# Patient Record
Sex: MALE | Race: WHITE | NOT HISPANIC OR LATINO | Employment: FULL TIME | URBAN - METROPOLITAN AREA
[De-identification: names, ages, dates, MRNs, and addresses within clinical notes are randomized per-mention and may not be internally consistent; named-entity substitution may affect disease eponyms.]

---

## 2018-03-07 ENCOUNTER — LAB REQUISITION (OUTPATIENT)
Dept: LAB | Facility: OTHER | Age: 33
End: 2018-03-07

## 2018-03-07 DIAGNOSIS — Z00.00 ROUTINE GENERAL MEDICAL EXAMINATION AT A HEALTH CARE FACILITY: ICD-10-CM

## 2018-03-07 LAB — HBV SURFACE AB SER RIA-ACNC: NORMAL

## 2018-03-07 PROCEDURE — 86706 HEP B SURFACE ANTIBODY: CPT | Performed by: PHYSICIAN ASSISTANT

## 2018-10-08 ENCOUNTER — OFFICE VISIT (OUTPATIENT)
Dept: FAMILY MEDICINE CLINIC | Facility: CLINIC | Age: 33
End: 2018-10-08

## 2018-10-08 VITALS
HEART RATE: 85 BPM | SYSTOLIC BLOOD PRESSURE: 150 MMHG | OXYGEN SATURATION: 98 % | DIASTOLIC BLOOD PRESSURE: 100 MMHG | HEIGHT: 70 IN | WEIGHT: 222.7 LBS | BODY MASS INDEX: 31.88 KG/M2

## 2018-10-08 DIAGNOSIS — M25.561 CHRONIC PAIN OF BOTH KNEES: ICD-10-CM

## 2018-10-08 DIAGNOSIS — G89.29 CHRONIC PAIN OF BOTH KNEES: ICD-10-CM

## 2018-10-08 DIAGNOSIS — I10 ESSENTIAL HYPERTENSION: Primary | ICD-10-CM

## 2018-10-08 DIAGNOSIS — M25.562 CHRONIC PAIN OF BOTH KNEES: ICD-10-CM

## 2018-10-08 DIAGNOSIS — K51.00 ULCERATIVE PANCOLITIS WITHOUT COMPLICATION (HCC): ICD-10-CM

## 2018-10-08 PROBLEM — K20.90 ESOPHAGITIS: Status: ACTIVE | Noted: 2018-10-08

## 2018-10-08 LAB
ALBUMIN SERPL-MCNC: 5.2 G/DL (ref 3.5–5.2)
ALBUMIN/GLOB SERPL: 1.9 G/DL
ALP SERPL-CCNC: 97 U/L (ref 39–117)
ALT SERPL-CCNC: 71 U/L (ref 1–41)
AST SERPL-CCNC: 47 U/L (ref 1–40)
BILIRUB SERPL-MCNC: 0.4 MG/DL (ref 0.1–1.2)
BUN SERPL-MCNC: 7 MG/DL (ref 6–20)
BUN/CREAT SERPL: 6.7 (ref 7–25)
CALCIUM SERPL-MCNC: 9.8 MG/DL (ref 8.6–10.5)
CHLORIDE SERPL-SCNC: 102 MMOL/L (ref 98–107)
CO2 SERPL-SCNC: 30.7 MMOL/L (ref 22–29)
CREAT SERPL-MCNC: 1.05 MG/DL (ref 0.76–1.27)
CRP SERPL-MCNC: 0.41 MG/DL (ref 0–0.5)
ERYTHROCYTE [DISTWIDTH] IN BLOOD BY AUTOMATED COUNT: 13.4 % (ref 11.5–14.5)
ERYTHROCYTE [SEDIMENTATION RATE] IN BLOOD BY WESTERGREN METHOD: 2 MM/HR (ref 0–15)
GLOBULIN SER CALC-MCNC: 2.8 GM/DL
GLUCOSE SERPL-MCNC: 102 MG/DL (ref 65–99)
HCT VFR BLD AUTO: 48.2 % (ref 40.4–52.2)
HGB BLD-MCNC: 16.4 G/DL (ref 13.7–17.6)
MCH RBC QN AUTO: 31.7 PG (ref 27–32.7)
MCHC RBC AUTO-ENTMCNC: 34 G/DL (ref 32.6–36.4)
MCV RBC AUTO: 93.1 FL (ref 79.8–96.2)
PLATELET # BLD AUTO: 242 10*3/MM3 (ref 140–500)
POTASSIUM SERPL-SCNC: 4.1 MMOL/L (ref 3.5–5.2)
PROT SERPL-MCNC: 8 G/DL (ref 6–8.5)
RBC # BLD AUTO: 5.18 10*6/MM3 (ref 4.6–6)
SODIUM SERPL-SCNC: 144 MMOL/L (ref 136–145)
WBC # BLD AUTO: 5.7 10*3/MM3 (ref 4.5–10.7)

## 2018-10-08 PROCEDURE — 99204 OFFICE O/P NEW MOD 45 MIN: CPT | Performed by: INTERNAL MEDICINE

## 2018-10-08 RX ORDER — LISINOPRIL 20 MG/1
20 TABLET ORAL DAILY
Qty: 30 TABLET | Refills: 2 | Status: SHIPPED | OUTPATIENT
Start: 2018-10-08 | End: 2019-01-09 | Stop reason: SDUPTHER

## 2018-10-08 NOTE — PROGRESS NOTES
Subjective   Adonis Grover is a 33 y.o. male who presents today for:    Hypertension (New patient)    History of Present Illness     This new patient has not had a family doctor in many years.  He does have a history of elevated blood pressures in the past but has not been on any medication for years.  He denies cardiovascular symptoms.  He does not monitor his blood pressure at home.    He was diagnosed with ulcerative colitis several years ago, treated until his symptoms resolved, but then stopped taking the medication.  He was having no problems with diarrhea until earlier this year.  Now has intermittent episodes of diarrhea without blood.  He denies weight loss.    He also has mild, chronic, recurrent bilateral knee pain.  He describes it as achy.  He feels like his knees are stiff/swollen, without significant effusion or redness.    There is a family history of hypertension in his mother.    Patient Health History Form was reviewed during the ov.      Mr. Grover  reports that he has never smoked. His smokeless tobacco use includes Chew. He reports that he drinks about 3.0 oz of alcohol per week . He reports that he does not use drugs.     No Known Allergies    Current Outpatient Prescriptions:   •  Multiple Vitamin (MULTI VITAMIN PO), Take 1 tablet by mouth Daily., Disp: , Rfl:       Review of Systems   Constitutional: Negative for chills, diaphoresis, fatigue, fever and unexpected weight change.   HENT: Positive for congestion, postnasal drip and rhinorrhea.    Eyes: Negative for visual disturbance.   Respiratory: Positive for cough. Negative for shortness of breath.    Cardiovascular: Negative for chest pain and palpitations.   Gastrointestinal: Positive for diarrhea (intermittent, episodic). Negative for abdominal pain, anal bleeding, blood in stool and rectal pain.   Endocrine: Negative for cold intolerance and heat intolerance.   Genitourinary: Negative for difficulty urinating.   Musculoskeletal:  "Positive for arthralgias and joint swelling. Negative for back pain and myalgias.   Skin: Negative for rash.   Allergic/Immunologic: Positive for environmental allergies.   Neurological: Negative for dizziness, syncope and numbness.   Hematological: Does not bruise/bleed easily.   Psychiatric/Behavioral: Positive for dysphoric mood. The patient is nervous/anxious.          Objective   Vitals:    10/08/18 1008   BP: 150/100   BP Location: Left arm   Patient Position: Sitting   Cuff Size: Large Adult   Pulse: 85   SpO2: 98%   Weight: 101 kg (222 lb 11.2 oz)   Height: 177.8 cm (70\")     Physical Exam   Constitutional: He is oriented to person, place, and time. He appears well-developed.   Overweight   Eyes: Conjunctivae are normal. No scleral icterus.   Neck: Carotid bruit is not present. No thyroid mass and no thyromegaly present.   Cardiovascular: Normal rate, regular rhythm, normal heart sounds and intact distal pulses.    No pedal edema.   Pulmonary/Chest: Effort normal and breath sounds normal.   Abdominal: Soft. Bowel sounds are normal. He exhibits no abdominal bruit.   Neurological: He is alert and oriented to person, place, and time. He has normal strength.   Psychiatric: He has a normal mood and affect.   Bilateral knees: No erythema, warmth, or effusion.  No significant crepitus with flexion and extension.  No ligamentous instability.  No significant joint line tenderness.            Adonis was seen today for hypertension.    Diagnoses and all orders for this visit:    Essential hypertension  Start lisinopril as ordered below.  He was counseled regarding the potential side effects.  He should contact us if those develop, or if any other problems develop after starting the medication.  Return in approximate 6 weeks for blood pressure recheck and labs.  -     Comprehensive Metabolic Panel  -     lisinopril (PRINIVIL,ZESTRIL) 20 MG tablet; Take 1 tablet by mouth Daily.    Ulcerative pancolitis without " complication (CMS/HCC)  -     Comprehensive Metabolic Panel  -     CBC (No Diff)  -     C-reactive Protein  -     Sedimentation Rate  -     Ambulatory Referral to Gastroenterology  The labs as ordered above for possibility of active disease.  Refer back to the gastroenterologist who was taking care of him previously.     Chronic pain of both knees  Check the labs as ordered above to ensure there is no element of inflammatory arthritis (IBD-related arthritis).  Avoid NSAIDs and use Tylenol only for now, especially since we do not know the status of his inflammatory bowel disease.

## 2018-10-09 NOTE — PROGRESS NOTES
Liver enzymes are elevated.  Recommend these be rechecked when he sees Dr. Ochoa later this year for UC follow-up; or prior to his follow-up visit with me in November, whichever is first.  Otherwise, rest of the labs look good.  TAS

## 2019-01-09 RX ORDER — LISINOPRIL 20 MG/1
20 TABLET ORAL DAILY
Qty: 30 TABLET | Refills: 0 | Status: SHIPPED | OUTPATIENT
Start: 2019-01-09 | End: 2019-02-27 | Stop reason: SDUPTHER

## 2019-01-21 ENCOUNTER — OFFICE VISIT (OUTPATIENT)
Dept: FAMILY MEDICINE CLINIC | Facility: CLINIC | Age: 34
End: 2019-01-21

## 2019-01-21 VITALS
DIASTOLIC BLOOD PRESSURE: 84 MMHG | BODY MASS INDEX: 31.85 KG/M2 | HEART RATE: 87 BPM | HEIGHT: 70 IN | SYSTOLIC BLOOD PRESSURE: 130 MMHG | WEIGHT: 222.5 LBS | OXYGEN SATURATION: 98 %

## 2019-01-21 DIAGNOSIS — I10 ESSENTIAL HYPERTENSION: Primary | ICD-10-CM

## 2019-01-21 DIAGNOSIS — R07.9 CHEST PAIN, UNSPECIFIED TYPE: ICD-10-CM

## 2019-01-21 PROCEDURE — 99214 OFFICE O/P EST MOD 30 MIN: CPT | Performed by: INTERNAL MEDICINE

## 2019-01-21 PROCEDURE — 93000 ELECTROCARDIOGRAM COMPLETE: CPT | Performed by: INTERNAL MEDICINE

## 2019-01-21 RX ORDER — PANTOPRAZOLE SODIUM 40 MG/1
40 TABLET, DELAYED RELEASE ORAL DAILY
Qty: 30 TABLET | Refills: 1 | Status: SHIPPED | OUTPATIENT
Start: 2019-01-21 | End: 2020-11-13

## 2019-02-27 RX ORDER — LISINOPRIL 20 MG/1
TABLET ORAL
Qty: 30 TABLET | Refills: 6 | Status: SHIPPED | OUTPATIENT
Start: 2019-02-27 | End: 2019-11-20 | Stop reason: SDUPTHER

## 2019-11-20 RX ORDER — LISINOPRIL 20 MG/1
20 TABLET ORAL DAILY
Qty: 30 TABLET | Refills: 0 | Status: SHIPPED | OUTPATIENT
Start: 2019-11-20 | End: 2019-12-18 | Stop reason: SDUPTHER

## 2019-11-21 ENCOUNTER — OFFICE VISIT (OUTPATIENT)
Dept: FAMILY MEDICINE CLINIC | Facility: CLINIC | Age: 34
End: 2019-11-21

## 2019-11-21 VITALS
TEMPERATURE: 98.4 F | RESPIRATION RATE: 18 BRPM | HEART RATE: 84 BPM | SYSTOLIC BLOOD PRESSURE: 122 MMHG | OXYGEN SATURATION: 98 % | DIASTOLIC BLOOD PRESSURE: 80 MMHG | WEIGHT: 214 LBS | BODY MASS INDEX: 30.64 KG/M2 | HEIGHT: 70 IN

## 2019-11-21 DIAGNOSIS — I10 ESSENTIAL HYPERTENSION: Primary | ICD-10-CM

## 2019-11-21 PROCEDURE — 99213 OFFICE O/P EST LOW 20 MIN: CPT | Performed by: NURSE PRACTITIONER

## 2019-11-21 RX ORDER — SULFAMETHOXAZOLE AND TRIMETHOPRIM 800; 160 MG/1; MG/1
2 TABLET ORAL
COMMUNITY
Start: 2019-11-14 | End: 2019-12-14

## 2019-11-21 NOTE — PROGRESS NOTES
"Subjective   Adonis Grover is a 34 y.o. male.     Chief Complaint   Patient presents with   • Hypertension     F/u      Hypertension   This is a chronic problem. The current episode started more than 1 month ago. The problem has been waxing and waning since onset. The problem is uncontrolled. Pertinent negatives include no anxiety, blurred vision, chest pain, headaches, malaise/fatigue, peripheral edema or shortness of breath. Risk factors for coronary artery disease include male gender and family history. Past treatments include ACE inhibitors. The current treatment provides moderate improvement. There are no compliance problems.       I have reviewed the patient's medical history in detail and updated the computerized patient record.    The following portions of the patient's history were reviewed and updated as appropriate: allergies, current medications, past family history, past medical history, past social history, past surgical history and problem list.       Current Outpatient Medications:   •  lisinopril (PRINIVIL,ZESTRIL) 20 MG tablet, Take 1 tablet by mouth Daily., Disp: 30 tablet, Rfl: 0  •  Multiple Vitamin (MULTI VITAMIN PO), Take 1 tablet by mouth Daily., Disp: , Rfl:   •  pantoprazole (PROTONIX) 40 MG EC tablet, Take 1 tablet by mouth Daily., Disp: 30 tablet, Rfl: 1  •  sulfamethoxazole-trimethoprim (BACTRIM DS,SEPTRA DS) 800-160 MG per tablet, Take 2 tablets by mouth., Disp: , Rfl:     Review of Systems   Constitutional: Negative for malaise/fatigue.   Eyes: Negative for blurred vision.   Respiratory: Negative for shortness of breath.    Cardiovascular: Negative for chest pain.      Vitals:    11/21/19 0840 11/21/19 0912   BP: 140/84 122/80   BP Location: Left arm    Patient Position: Sitting    Cuff Size: Adult    Pulse: 84    Resp: 18    Temp: 98.4 °F (36.9 °C)    TempSrc: Oral    SpO2: 98%    Weight: 97.1 kg (214 lb)    Height: 177.8 cm (70\")        Objective   Physical Exam   Constitutional: He " is oriented to person, place, and time. He appears well-developed and well-nourished.   Cardiovascular: Normal rate, regular rhythm, normal heart sounds and intact distal pulses.   Pulmonary/Chest: Effort normal and breath sounds normal.   Neurological: He is alert and oriented to person, place, and time.   Skin: Skin is warm and dry.   Psychiatric:   No acute distress   Vitals reviewed.        Assessment/Plan   Adonis was seen today for hypertension.    Diagnoses and all orders for this visit:    Essential hypertension      Mr. Grover presents today to follow up on his blood pressure control. His blood pressure today in the office is 140/84 and 122/80. He is to continue taking Lisinopril 20 mg daily.   We discussed that he is having nausea and vomiting after taking Bactrim DS. He reports that he immediately vomits after taking the medication and when he does he usually vomits up anything that is in his stomach including his medications. I have advised him to call his infectious disease physician to make her aware of his intolerance for the Bactrim.   He is to follow up with me in July for an annual preventive exam with fasting labs the week before.

## 2019-12-18 RX ORDER — LISINOPRIL 20 MG/1
TABLET ORAL
Qty: 90 TABLET | Refills: 1 | Status: SHIPPED | OUTPATIENT
Start: 2019-12-18 | End: 2020-06-16

## 2020-06-16 RX ORDER — LISINOPRIL 20 MG/1
TABLET ORAL
Qty: 90 TABLET | Refills: 0 | Status: SHIPPED | OUTPATIENT
Start: 2020-06-16 | End: 2020-09-14

## 2020-06-22 DIAGNOSIS — Z11.59 ENCOUNTER FOR HEPATITIS C SCREENING TEST FOR LOW RISK PATIENT: ICD-10-CM

## 2020-06-22 DIAGNOSIS — Z00.00 ROUTINE GENERAL MEDICAL EXAMINATION AT A HEALTH CARE FACILITY: Primary | ICD-10-CM

## 2020-06-28 ENCOUNTER — RESULTS ENCOUNTER (OUTPATIENT)
Dept: FAMILY MEDICINE CLINIC | Facility: CLINIC | Age: 35
End: 2020-06-28

## 2020-06-28 DIAGNOSIS — Z11.59 ENCOUNTER FOR HEPATITIS C SCREENING TEST FOR LOW RISK PATIENT: ICD-10-CM

## 2020-06-28 DIAGNOSIS — Z00.00 ROUTINE GENERAL MEDICAL EXAMINATION AT A HEALTH CARE FACILITY: ICD-10-CM

## 2020-09-14 RX ORDER — LISINOPRIL 20 MG/1
20 TABLET ORAL DAILY
Qty: 90 TABLET | Refills: 0 | Status: SHIPPED | OUTPATIENT
Start: 2020-09-14 | End: 2021-01-12 | Stop reason: SDUPTHER

## 2020-11-12 ENCOUNTER — TELEPHONE (OUTPATIENT)
Dept: FAMILY MEDICINE CLINIC | Facility: CLINIC | Age: 35
End: 2020-11-12

## 2020-11-12 NOTE — TELEPHONE ENCOUNTER
PATIENT CALLED AND STATES HIS BLOOD PRESSURE IS RUNNING 178/117 LAST NIGHT. HE IS CURRENTLY ON   lisinopril (PRINIVIL,ZESTRIL) 20 MG tablet    HE HAS MADE AN APPOINTMENT FOR 11/17/2020 VIA [employer] BUT PREFERS OTHER TELEHEALTH OPTIONS.  HE WANTS TO KNOW IF HE CAN INCREASE HIS MEDICATION.  PLEASE CALL AND ADVISE [phone]

## 2020-11-12 NOTE — TELEPHONE ENCOUNTER
I will need to see him in person to assess his blood pressure. Will not make changes until I can see him and distinguish if that was a one time event. Please call him and ask him to come in and to record his blood pressures over the next few days.

## 2020-11-13 ENCOUNTER — TELEMEDICINE (OUTPATIENT)
Dept: FAMILY MEDICINE CLINIC | Facility: CLINIC | Age: 35
End: 2020-11-13

## 2020-11-13 DIAGNOSIS — I10 ESSENTIAL HYPERTENSION: Primary | ICD-10-CM

## 2020-11-13 PROCEDURE — 99214 OFFICE O/P EST MOD 30 MIN: CPT | Performed by: NURSE PRACTITIONER

## 2020-11-13 RX ORDER — HYDROCHLOROTHIAZIDE 12.5 MG/1
12.5 TABLET ORAL DAILY
Qty: 30 TABLET | Refills: 5 | Status: SHIPPED | OUTPATIENT
Start: 2020-11-13 | End: 2021-01-12 | Stop reason: SDUPTHER

## 2020-11-13 NOTE — PROGRESS NOTES
Subjective   Adonis Grover is a 35 y.o. male.     Chief Complaint   Patient presents with   • Hypertension     This visit has been rescheduled as a telehealth visit to comply with patient safety concerns in accordance with CDC recommendations. Total time of discussion was 18  minutes.    You have chosen to receive care through a telehealth visit.  Do you consent to use a video/audio connection for your medical care today? Yes    Hypertension  This is a chronic problem. The current episode started more than 1 year ago. Progression since onset: starte elevating 2 days ago  The problem is uncontrolled. Associated symptoms include headaches and shortness of breath (if moving around a lot). Pertinent negatives include no blurred vision, chest pain, malaise/fatigue, palpitations or peripheral edema. Risk factors for coronary artery disease include male gender and obesity. Past treatments include ACE inhibitors. Current antihypertension treatment includes ACE inhibitors.        I have reviewed the patient's medical history in detail and updated the computerized patient record.    The following portions of the patient's history were reviewed and updated as appropriate: allergies, current medications, past family history, past medical history, past social history, past surgical history and problem list.      Current Outpatient Medications:   •  lisinopril (PRINIVIL,ZESTRIL) 20 MG tablet, Take 1 tablet by mouth Daily. PATIENT NEEDS APPOINTMENT FOR FURTHER REFILLS, Disp: 90 tablet, Rfl: 0  •  hydroCHLOROthiazide (HYDRODIURIL) 12.5 MG tablet, Take 1 tablet by mouth Daily., Disp: 30 tablet, Rfl: 5     Review of Systems   Constitutional: Negative for malaise/fatigue.   Eyes: Negative for blurred vision.   Respiratory: Positive for shortness of breath (if moving around a lot).    Cardiovascular: Negative for chest pain and palpitations.       Objective    There were no vitals filed for this visit.     Physical Exam  Vitals signs  reviewed.   Constitutional:       Appearance: Normal appearance.   Neurological:      Mental Status: He is alert and oriented to person, place, and time.   Psychiatric:      Comments: No acute distress           Assessment/Plan   Diagnoses and all orders for this visit:    1. Essential hypertension (Primary)    Other orders  -     hydroCHLOROthiazide (HYDRODIURIL) 12.5 MG tablet; Take 1 tablet by mouth Daily.  Dispense: 30 tablet; Refill: 5    Mr. Grover presents per video visit with elevated blood pressure x 2 days. He had tried increasing his Lisinopril form 20 mg daily to 40 mg. He did not see any changes in his blood pressure. His reported blood pressures last night after taking a second dose of Lisinopril was running 180/107 and 161/100. He reports his most recent blood pressure at 2 pm today was 152/96.  He is to continue the Lisinopril 20 mg daily and start HCTZ 12.5 mg daily.   I have asked him to follow up in 2 weeks on his blood pressure control.

## 2020-11-30 ENCOUNTER — OFFICE VISIT (OUTPATIENT)
Dept: FAMILY MEDICINE CLINIC | Facility: CLINIC | Age: 35
End: 2020-11-30

## 2020-11-30 VITALS
DIASTOLIC BLOOD PRESSURE: 78 MMHG | TEMPERATURE: 96.6 F | WEIGHT: 220 LBS | HEIGHT: 70 IN | HEART RATE: 88 BPM | SYSTOLIC BLOOD PRESSURE: 130 MMHG | BODY MASS INDEX: 31.5 KG/M2 | OXYGEN SATURATION: 98 %

## 2020-11-30 DIAGNOSIS — I10 ESSENTIAL HYPERTENSION: Primary | ICD-10-CM

## 2020-11-30 PROCEDURE — 99213 OFFICE O/P EST LOW 20 MIN: CPT | Performed by: NURSE PRACTITIONER

## 2020-11-30 NOTE — PROGRESS NOTES
Subjective   Adonis Grover is a 35 y.o. male.     Chief Complaint   Patient presents with   • Hypertension   • Dizziness     History of Present Illness     I have reviewed the patient's medical history in detail and updated the computerized patient record.    The following portions of the patient's history were reviewed and updated as appropriate: allergies, current medications, past family history, past medical history, past social history, past surgical history and problem list.      Current Outpatient Medications:   •  hydroCHLOROthiazide (HYDRODIURIL) 12.5 MG tablet, Take 1 tablet by mouth Daily., Disp: 30 tablet, Rfl: 5  •  lisinopril (PRINIVIL,ZESTRIL) 20 MG tablet, Take 1 tablet by mouth Daily. PATIENT NEEDS APPOINTMENT FOR FURTHER REFILLS (Patient taking differently: Take 20 mg by mouth Daily.), Disp: 90 tablet, Rfl: 0     Review of Systems   Constitutional: Negative.    Eyes: Positive for blurred vision (with episode of dizziness).   Respiratory: Positive for shortness of breath (with episode of dizziness).    Cardiovascular: Negative.    Neurological: Positive for dizziness.   Psychiatric/Behavioral: Negative.        Objective    Vitals:    11/30/20 1404   BP: 130/78   Pulse: 88   Temp: 96.6 °F (35.9 °C)   SpO2: 98%     Physical Exam  Constitutional:       Appearance: Normal appearance.   Cardiovascular:      Rate and Rhythm: Normal rate and regular rhythm.      Pulses: Normal pulses.      Heart sounds: Normal heart sounds.   Pulmonary:      Effort: Pulmonary effort is normal.      Breath sounds: Normal breath sounds.   Musculoskeletal:      Right lower leg: No edema.      Left lower leg: No edema.   Neurological:      Mental Status: He is alert and oriented to person, place, and time.   Psychiatric:      Comments: No acute distress           Assessment/Plan   Diagnoses and all orders for this visit:    1. Essential hypertension (Primary)       Mr. Grover's blood pressure today in the office is 130/78. He  "reports that his blood pressure at home since starting the HCTZ with the lisinopril has been running around 120/82 or lower. He has had some dizziness since starting the medication. We discussed that even though he is taking a \"water pill\" to lower his blood pressure it is important that he drinks plenty of water to stay well hydrated. He is to continue both the lisinopril 20 mg daily and the HCTZ 12.5 mg daily as prescribed.   Follow up in 6 months for an annual physical exam with labs the week before.        "

## 2021-01-12 RX ORDER — LISINOPRIL 20 MG/1
20 TABLET ORAL DAILY
Qty: 30 TABLET | Refills: 4 | Status: SHIPPED | OUTPATIENT
Start: 2021-01-12 | End: 2021-04-05 | Stop reason: SDUPTHER

## 2021-01-12 RX ORDER — HYDROCHLOROTHIAZIDE 12.5 MG/1
12.5 TABLET ORAL DAILY
Qty: 30 TABLET | Refills: 4 | Status: SHIPPED | OUTPATIENT
Start: 2021-01-12 | End: 2021-04-05 | Stop reason: SDUPTHER

## 2021-01-12 NOTE — TELEPHONE ENCOUNTER
Caller: Adonis Grover    Relationship: Self    Best call back number: [phone]    Medication needed:   Requested Prescriptions     Pending Prescriptions Disp Refills   • hydroCHLOROthiazide (HYDRODIURIL) 12.5 MG tablet 30 tablet 5     Sig: Take 1 tablet by mouth Daily.   • lisinopril (PRINIVIL,ZESTRIL) 20 MG tablet 90 tablet 0     Sig: Take 1 tablet by mouth Daily. PATIENT NEEDS APPOINTMENT FOR FURTHER REFILLS       When do you need the refill by: tomorrow      Does the patient have less than a 3 day supply:  [x] Yes  [] No    What is the patient's preferred pharmacy: [facility] [address] - [phone]  - [phone] FX

## 2021-04-05 ENCOUNTER — TELEPHONE (OUTPATIENT)
Dept: FAMILY MEDICINE CLINIC | Facility: CLINIC | Age: 36
End: 2021-04-05

## 2021-04-05 RX ORDER — HYDROCHLOROTHIAZIDE 12.5 MG/1
12.5 TABLET ORAL DAILY
Qty: 30 TABLET | Refills: 4 | Status: SHIPPED | OUTPATIENT
Start: 2021-04-05 | End: 2021-08-23 | Stop reason: ALTCHOICE

## 2021-04-05 RX ORDER — LISINOPRIL 20 MG/1
20 TABLET ORAL DAILY
Qty: 30 TABLET | Refills: 4 | Status: SHIPPED | OUTPATIENT
Start: 2021-04-05 | End: 2021-08-23 | Stop reason: ALTCHOICE

## 2021-04-05 NOTE — TELEPHONE ENCOUNTER
Caller: Adonis Grover    Relationship: Self    Best call back number: [phone] (H)    Medication needed:   Requested Prescriptions     Pending Prescriptions Disp Refills   • lisinopril (PRINIVIL,ZESTRIL) 20 MG tablet 30 tablet 4     Sig: Take 1 tablet by mouth Daily.   • hydroCHLOROthiazide (HYDRODIURIL) 12.5 MG tablet 30 tablet 4     Sig: Take 1 tablet by mouth Daily.       When do you need the refill by: AS SOON AS POSSIBLE     What additional details did the patient provide when requesting the medication: PATIENT HAS  5 lisinopril (PRINIVIL,ZESTRIL) 20 MG tablet LEFT AND 13 hydroCHLOROthiazide (HYDRODIURIL) 12.5 MG tablet BUT STATED HE IS GOING OUT OF TOWN ON Saturday FOR 1 WEEK AND WILL NEED TO TAKE REFILL WITH HIM.   Does the patient have less than a 3 day supply:  [] Yes  [x] No    What is the patient's preferred pharmacy:      [facility] [address] - [phone] [facility] [phone]   [phone]    PATIENTS LAST OV: 11/30/20  PATIENTS NEXT OV: 6/2/21

## 2021-05-14 DIAGNOSIS — Z00.00 ROUTINE GENERAL MEDICAL EXAMINATION AT A HEALTH CARE FACILITY: Primary | ICD-10-CM

## 2021-08-23 ENCOUNTER — OFFICE VISIT (OUTPATIENT)
Dept: FAMILY MEDICINE CLINIC | Facility: CLINIC | Age: 36
End: 2021-08-23

## 2021-08-23 VITALS
OXYGEN SATURATION: 98 % | WEIGHT: 191 LBS | BODY MASS INDEX: 27.35 KG/M2 | HEIGHT: 70 IN | HEART RATE: 93 BPM | DIASTOLIC BLOOD PRESSURE: 68 MMHG | SYSTOLIC BLOOD PRESSURE: 140 MMHG

## 2021-08-23 DIAGNOSIS — T67.5XXD HEAT EXHAUSTION, SUBSEQUENT ENCOUNTER: ICD-10-CM

## 2021-08-23 DIAGNOSIS — I10 ESSENTIAL HYPERTENSION: ICD-10-CM

## 2021-08-23 DIAGNOSIS — Z09 HOSPITAL DISCHARGE FOLLOW-UP: Primary | ICD-10-CM

## 2021-08-23 PROCEDURE — 99212 OFFICE O/P EST SF 10 MIN: CPT | Performed by: NURSE PRACTITIONER

## 2021-08-23 NOTE — PROGRESS NOTES
"Chief Complaint  Hypertension (hosp f/u- [facility] after heat exposure)    Subjective          Adonis Grover presents to [facility]  Mr. Grover presents today to follow up on a recent hospital admission at [facility] on 8/20/21.  He was admitted for heat exhaustion and dehydration. He reports that this is the second time he has been in the ER for heat exhaustion since June.  He was given IV fluids over night in the ER and then discharged. He was told he had an acute kidney injury due to his blood pressure medications, heat exhaustion and dehydration. He has stopped taking both the Lisinopril and HCTZ.       No current outpatient medications on file.       Objective   Vital Signs:   /68   Pulse 93   Ht 177.8 cm (70\")   Wt 86.6 kg (191 lb)   SpO2 98%   BMI 27.41 kg/m²       Physical Exam  Vitals reviewed.   Constitutional:       Appearance: Normal appearance.   Cardiovascular:      Rate and Rhythm: Normal rate and regular rhythm.      Pulses: Normal pulses.      Heart sounds: Normal heart sounds.   Pulmonary:      Effort: Pulmonary effort is normal.      Breath sounds: Normal breath sounds.   Skin:     General: Skin is warm and dry.   Neurological:      Mental Status: He is alert and oriented to person, place, and time.   Psychiatric:      Comments: No acute distress        Result Review :              Vitals:    08/23/21 1258 08/23/21 1320   BP: 144/60 140/68   BP Location: Left arm    Patient Position: Sitting    Cuff Size: Adult    Pulse: 93    SpO2: 98%    Weight: 86.6 kg (191 lb)    Height: 177.8 cm (70\")         Assessment and Plan    Diagnoses and all orders for this visit:    1. Hospital discharge follow-up (Primary)    2. Heat exhaustion, subsequent encounter    3. Essential hypertension    Mr. Grover appears to be doing well today.  His blood pressure is slightly elevated at 144/60 and 140/68 with out his medications.  We discussed that he needs to " increase his fluid intake and he reports he is drinking 9 bottles of water and Gator aid since his first episode in June.   We discussed that it can take weeks to recover fully from heat exhaustion and the importance to stay well hydrated.   He is to remain out of work for the rest of the week  I would like to see him on Friday to evaluate his blood pressure.       Follow Up   Return for f/u on friday to check on blood pressure.  Patient was given instructions and counseling regarding his condition or for health maintenance advice. Please see specific information pulled into the AVS if appropriate.

## 2021-08-27 ENCOUNTER — OFFICE VISIT (OUTPATIENT)
Dept: FAMILY MEDICINE CLINIC | Facility: CLINIC | Age: 36
End: 2021-08-27

## 2021-08-27 VITALS
WEIGHT: 194 LBS | OXYGEN SATURATION: 99 % | SYSTOLIC BLOOD PRESSURE: 118 MMHG | HEART RATE: 81 BPM | HEIGHT: 70 IN | DIASTOLIC BLOOD PRESSURE: 70 MMHG | BODY MASS INDEX: 27.77 KG/M2

## 2021-08-27 DIAGNOSIS — I10 ESSENTIAL HYPERTENSION: Primary | ICD-10-CM

## 2021-08-27 PROCEDURE — 99212 OFFICE O/P EST SF 10 MIN: CPT | Performed by: NURSE PRACTITIONER

## 2021-08-27 NOTE — PROGRESS NOTES
"Chief Complaint  Hypertension (f/u)    Subjective          Adonis Grover presents to [facility]  Mr. Grover presents today to follow up on his blood pressure after stopping his medication. His medication was discontinued earlier this week due to heat exhaustion and dehydration.       No current outpatient medications on file.     Objective   Vital Signs:   /70 (BP Location: Right arm, Patient Position: Sitting, Cuff Size: Adult)   Pulse 81   Ht 177.8 cm (70\")   Wt 88 kg (194 lb)   SpO2 99%   BMI 27.84 kg/m²     Physical Exam  Constitutional:       Appearance: Normal appearance.   Cardiovascular:      Rate and Rhythm: Normal rate and regular rhythm.      Pulses: Normal pulses.      Heart sounds: Normal heart sounds.   Pulmonary:      Effort: Pulmonary effort is normal.      Breath sounds: Normal breath sounds.   Skin:     General: Skin is warm and dry.   Neurological:      Mental Status: He is alert and oriented to person, place, and time.   Psychiatric:      Comments: No acute distress        Result Review :                 Assessment and Plan    Diagnoses and all orders for this visit:    1. Essential hypertension (Primary)    Mr. Grover's blood pressure is stable today at 118/70. He reports he is staying well hydrated drinking water and alternating it with gator aid. He states he feels so much better.   He is to remain off of the blood pressure medication for now and I will reassess his blood pressure in 4 months.     Follow Up   Return in about 4 months (around 12/27/2021) for f/u on blood pressure.  Patient was given instructions and counseling regarding his condition or for health maintenance advice. Please see specific information pulled into the AVS if appropriate.       "

## 2021-08-30 ENCOUNTER — TELEPHONE (OUTPATIENT)
Dept: FAMILY MEDICINE CLINIC | Facility: CLINIC | Age: 36
End: 2021-08-30

## 2021-08-30 NOTE — TELEPHONE ENCOUNTER
Please call him and let him know I suggest he goes home if he is still having symptoms. His labs indicated he is well hydrated. He needs to back off on the gator aid because his sodium and chloride are elevated.  All other labs look good.

## 2021-08-30 NOTE — TELEPHONE ENCOUNTER
Pt at work today and is experiencing feet, hands and lips tingling with extreme fatigue since 10 AM.  Pt on 4th bottle of water.  Please contact pt @ [phone].  Thanks, BRENDEN

## 2021-08-30 NOTE — TELEPHONE ENCOUNTER
S/w patient and he stated that he has already had this morning a 32oz gatorade and 5 bottles of water.  Patient was given his results and told to limit his gatorade intake per day to 2 16oz bottles when he is actively outside sweating.  He can have one at lunch and if needed he can have the second at the end of shift. He was also told to watch the amount of water he drinks and to pace himself through out the day. Patient stated his knight had suggested that he take the rest of the day off.  Patient asked about taking tomorrow off as well.  He was told to watch the weather and to play it be ear as far as taking off any the rest of the week.    Patient was also told to inquire about FMLA and to request the paperwork since the heat exhaustion may be several weeks before it subsides completely.  Patient stated he will request the FMLA and get it to us to complete.  Patient was told about the $20 fee to complete the paperwork.    Patient voiced understanding to all information given to him.

## 2021-08-31 ENCOUNTER — TELEPHONE (OUTPATIENT)
Dept: FAMILY MEDICINE CLINIC | Facility: CLINIC | Age: 36
End: 2021-08-31

## 2021-08-31 NOTE — TELEPHONE ENCOUNTER
Caller: Adonis Grover    Relationship: Self    Best call back number: [phone]    What form or medical record are you requesting: OFF WORK NOTE FOR TODAY 8/31/21    Who is requesting this form or medical record from you: PATIENT     How would you like to receive the form or medical records (pick-up, mail, fax):  If fax, what is the fax number:  If mail, what is the address:   If pick-up, provide patient with address and location details    Timeframe paperwork needed: ASAP    Additional notes:

## 2021-09-10 DIAGNOSIS — K51.919 ULCERATIVE COLITIS WITH COMPLICATION, UNSPECIFIED LOCATION (HCC): Primary | ICD-10-CM

## 2023-10-24 DIAGNOSIS — K21.9 GASTROESOPHAGEAL REFLUX DISEASE WITHOUT ESOPHAGITIS: ICD-10-CM

## 2023-10-24 DIAGNOSIS — Z87.19 HISTORY OF ULCERATIVE COLITIS: Primary | ICD-10-CM
